# Patient Record
Sex: MALE | Race: OTHER
[De-identification: names, ages, dates, MRNs, and addresses within clinical notes are randomized per-mention and may not be internally consistent; named-entity substitution may affect disease eponyms.]

---

## 2019-07-11 ENCOUNTER — HOSPITAL ENCOUNTER (EMERGENCY)
Dept: HOSPITAL 62 - ER | Age: 39
LOS: 1 days | Discharge: LEFT BEFORE DISCHARGE | End: 2019-07-12
Payer: SELF-PAY

## 2019-07-11 VITALS — SYSTOLIC BLOOD PRESSURE: 126 MMHG | DIASTOLIC BLOOD PRESSURE: 78 MMHG

## 2019-07-11 DIAGNOSIS — M79.602: ICD-10-CM

## 2019-07-11 DIAGNOSIS — Z53.21: Primary | ICD-10-CM

## 2019-07-11 DIAGNOSIS — R00.2: ICD-10-CM

## 2019-07-11 DIAGNOSIS — R68.84: ICD-10-CM

## 2019-07-11 DIAGNOSIS — R07.9: ICD-10-CM

## 2019-07-11 DIAGNOSIS — F17.200: ICD-10-CM

## 2019-07-11 PROCEDURE — 71046 X-RAY EXAM CHEST 2 VIEWS: CPT

## 2019-07-11 PROCEDURE — 99281 EMR DPT VST MAYX REQ PHY/QHP: CPT

## 2019-07-11 PROCEDURE — 93005 ELECTROCARDIOGRAM TRACING: CPT

## 2019-07-11 PROCEDURE — 93010 ELECTROCARDIOGRAM REPORT: CPT

## 2019-07-11 NOTE — ER DOCUMENT REPORT
ED Medical Screen (RME)





- General


Chief Complaint: Chest Pain


Stated Complaint: CHEST PAIN


Time Seen by Provider: 07/11/19 23:28


Primary Care Provider: 


DENISE SCHNEIDER [Primary Care Provider] - Follow up as needed


TRAVEL OUTSIDE OF THE [place] IN LAST 30 DAYS: No





- HPI


Notes: 





07/11/19 23:38


38 year old male to the ED with C/o left sided chest pain, palpitations, left 

arm pain, left jaw pain that began yesterday.  States that the pain is episodic 

-- it comes and goes -- lasting about a minute at a time.   States he has a 

little sensation of shortness of breath.  He has not been sweating.  He denies 

vomiting.  He is a smoker.  Denies cocaine use.   He denies any history of DM, 

HTN, HLD, AMI in himself.  He is unsure of his family hx.   Does admit that he 

just recently drove to and from [place] for work.  He denies leg swelling.  Has 

not taken anything for that pain.  Denies worsening pain with exertion.  








I have performed a medical screening exam on this patient.   He will require 

further evaluation with blood work, CXR, EKG.   Patient agrees with the plan.   

Will have patient seen mainside provider for further management. 








Past Medical History





- Immunizations


Hx Diphtheria, Pertussis, Tetanus Vaccination: Yes





Physical Exam





- Vital signs


Vitals: 





                                        











Temp Pulse Resp BP Pulse Ox


 


 98.1 F   65   15   126/78 H  98 


 


 07/11/19 23:15  07/11/19 23:15  07/11/19 23:15  07/11/19 23:15  07/11/19 23:15











Interpretation: Normal





- General


General appearance: Appears well, Alert


In distress: None





- Respiratory


Respiratory status: No respiratory distress


Chest status: Nontender


Breath sounds: Normal


Chest palpation: Normal





- Cardiovascular


Rhythm: Regular


Heart sounds: Normal auscultation


Murmur: No


Notes: 





no leg edema 





Course





- Vital Signs


Vital signs: 





                                        











Temp Pulse Resp BP Pulse Ox


 


 98.1 F   65   15   126/78 H  98 


 


 07/11/19 23:15  07/11/19 23:15  07/11/19 23:15  07/11/19 23:15  07/11/19 23:15














Doctor's Discharge





- Discharge


Referrals: 


DENISE SCHNEIDER [Primary Care Provider] - Follow up as needed

## 2019-07-11 NOTE — RADIOLOGY REPORT (SQ)
CLINICAL HISTORY:  cp 



COMPARISON: None.



TECHNIQUE: XR CHEST 2 VIEWS 7/11/2019 12:00 AM CDT



FINDINGS: 



Cardiac silhouette is normal in size. Lungs are clear without

consolidation, atelectasis, mass or edema. There is no pleural

effusion. There is no pneumothorax. There are no acute osseous

findings.



IMPRESSION: 



Clear lungs.

## 2019-07-12 NOTE — EKG REPORT
SEVERITY:- ABNORMAL ECG -

SINUS RHYTHM

IVCD, CONSIDER ATYPICAL RBBB

:

Confirmed by: Shyla Canales MD 12-Jul-2019 22:43:41